# Patient Record
Sex: FEMALE | Race: OTHER | HISPANIC OR LATINO | URBAN - METROPOLITAN AREA
[De-identification: names, ages, dates, MRNs, and addresses within clinical notes are randomized per-mention and may not be internally consistent; named-entity substitution may affect disease eponyms.]

---

## 2024-08-07 ENCOUNTER — INPATIENT (INPATIENT)
Facility: HOSPITAL | Age: 54
LOS: 0 days | Discharge: HOME | DRG: 92 | End: 2024-08-08
Attending: INTERNAL MEDICINE | Admitting: INTERNAL MEDICINE
Payer: COMMERCIAL

## 2024-08-07 ENCOUNTER — RESULT REVIEW (OUTPATIENT)
Age: 54
End: 2024-08-07

## 2024-08-07 VITALS
RESPIRATION RATE: 18 BRPM | DIASTOLIC BLOOD PRESSURE: 80 MMHG | TEMPERATURE: 98 F | OXYGEN SATURATION: 97 % | HEIGHT: 62 IN | HEART RATE: 64 BPM | WEIGHT: 190.26 LBS | SYSTOLIC BLOOD PRESSURE: 163 MMHG

## 2024-08-07 DIAGNOSIS — E78.5 HYPERLIPIDEMIA, UNSPECIFIED: ICD-10-CM

## 2024-08-07 DIAGNOSIS — Z29.9 ENCOUNTER FOR PROPHYLACTIC MEASURES, UNSPECIFIED: ICD-10-CM

## 2024-08-07 DIAGNOSIS — E11.9 TYPE 2 DIABETES MELLITUS WITHOUT COMPLICATIONS: ICD-10-CM

## 2024-08-07 DIAGNOSIS — I63.9 CEREBRAL INFARCTION, UNSPECIFIED: ICD-10-CM

## 2024-08-07 DIAGNOSIS — R20.0 ANESTHESIA OF SKIN: ICD-10-CM

## 2024-08-07 LAB
ALBUMIN SERPL ELPH-MCNC: 4 G/DL — SIGNIFICANT CHANGE UP (ref 3.5–5)
ALBUMIN SERPL ELPH-MCNC: 4 G/DL — SIGNIFICANT CHANGE UP (ref 3.5–5)
ALP SERPL-CCNC: 84 U/L — SIGNIFICANT CHANGE UP (ref 40–120)
ALP SERPL-CCNC: 99 U/L — SIGNIFICANT CHANGE UP (ref 40–120)
ALT FLD-CCNC: 36 U/L DA — SIGNIFICANT CHANGE UP (ref 10–60)
ALT FLD-CCNC: 38 U/L DA — SIGNIFICANT CHANGE UP (ref 10–60)
ANION GAP SERPL CALC-SCNC: 4 MMOL/L — LOW (ref 5–17)
ANION GAP SERPL CALC-SCNC: 4 MMOL/L — LOW (ref 5–17)
APTT BLD: 34.5 SEC — SIGNIFICANT CHANGE UP (ref 24.5–35.6)
AST SERPL-CCNC: 28 U/L — SIGNIFICANT CHANGE UP (ref 10–40)
AST SERPL-CCNC: 35 U/L — SIGNIFICANT CHANGE UP (ref 10–40)
BASOPHILS # BLD AUTO: 0.07 K/UL — SIGNIFICANT CHANGE UP (ref 0–0.2)
BASOPHILS NFR BLD AUTO: 0.9 % — SIGNIFICANT CHANGE UP (ref 0–2)
BILIRUB SERPL-MCNC: 0.7 MG/DL — SIGNIFICANT CHANGE UP (ref 0.2–1.2)
BILIRUB SERPL-MCNC: 0.7 MG/DL — SIGNIFICANT CHANGE UP (ref 0.2–1.2)
BUN SERPL-MCNC: 12 MG/DL — SIGNIFICANT CHANGE UP (ref 7–18)
BUN SERPL-MCNC: 9 MG/DL — SIGNIFICANT CHANGE UP (ref 7–18)
CALCIUM SERPL-MCNC: 8.8 MG/DL — SIGNIFICANT CHANGE UP (ref 8.4–10.5)
CALCIUM SERPL-MCNC: 9.2 MG/DL — SIGNIFICANT CHANGE UP (ref 8.4–10.5)
CHLORIDE SERPL-SCNC: 107 MMOL/L — SIGNIFICANT CHANGE UP (ref 96–108)
CHLORIDE SERPL-SCNC: 108 MMOL/L — SIGNIFICANT CHANGE UP (ref 96–108)
CHOLEST SERPL-MCNC: 140 MG/DL — SIGNIFICANT CHANGE UP
CO2 SERPL-SCNC: 30 MMOL/L — SIGNIFICANT CHANGE UP (ref 22–31)
CO2 SERPL-SCNC: 31 MMOL/L — SIGNIFICANT CHANGE UP (ref 22–31)
CREAT SERPL-MCNC: 0.74 MG/DL — SIGNIFICANT CHANGE UP (ref 0.5–1.3)
CREAT SERPL-MCNC: 0.74 MG/DL — SIGNIFICANT CHANGE UP (ref 0.5–1.3)
EGFR: 95 ML/MIN/1.73M2 — SIGNIFICANT CHANGE UP
EGFR: 95 ML/MIN/1.73M2 — SIGNIFICANT CHANGE UP
EOSINOPHIL # BLD AUTO: 0.23 K/UL — SIGNIFICANT CHANGE UP (ref 0–0.5)
EOSINOPHIL NFR BLD AUTO: 2.8 % — SIGNIFICANT CHANGE UP (ref 0–6)
GLUCOSE BLDC GLUCOMTR-MCNC: 102 MG/DL — HIGH (ref 70–99)
GLUCOSE BLDC GLUCOMTR-MCNC: 117 MG/DL — HIGH (ref 70–99)
GLUCOSE BLDC GLUCOMTR-MCNC: 120 MG/DL — HIGH (ref 70–99)
GLUCOSE BLDC GLUCOMTR-MCNC: 151 MG/DL — HIGH (ref 70–99)
GLUCOSE SERPL-MCNC: 120 MG/DL — HIGH (ref 70–99)
GLUCOSE SERPL-MCNC: 190 MG/DL — HIGH (ref 70–99)
HCT VFR BLD CALC: 37 % — SIGNIFICANT CHANGE UP (ref 34.5–45)
HCT VFR BLD CALC: 38.7 % — SIGNIFICANT CHANGE UP (ref 34.5–45)
HDLC SERPL-MCNC: 42 MG/DL — LOW
HGB BLD-MCNC: 12.4 G/DL — SIGNIFICANT CHANGE UP (ref 11.5–15.5)
HGB BLD-MCNC: 13.3 G/DL — SIGNIFICANT CHANGE UP (ref 11.5–15.5)
IMM GRANULOCYTES NFR BLD AUTO: 0.1 % — SIGNIFICANT CHANGE UP (ref 0–0.9)
INR BLD: 1.05 RATIO — SIGNIFICANT CHANGE UP (ref 0.85–1.18)
LIPID PNL WITH DIRECT LDL SERPL: 73 MG/DL — SIGNIFICANT CHANGE UP
LYMPHOCYTES # BLD AUTO: 2.83 K/UL — SIGNIFICANT CHANGE UP (ref 1–3.3)
LYMPHOCYTES # BLD AUTO: 34.8 % — SIGNIFICANT CHANGE UP (ref 13–44)
MAGNESIUM SERPL-MCNC: 2.3 MG/DL — SIGNIFICANT CHANGE UP (ref 1.6–2.6)
MCHC RBC-ENTMCNC: 30 PG — SIGNIFICANT CHANGE UP (ref 27–34)
MCHC RBC-ENTMCNC: 30.6 PG — SIGNIFICANT CHANGE UP (ref 27–34)
MCHC RBC-ENTMCNC: 33.5 GM/DL — SIGNIFICANT CHANGE UP (ref 32–36)
MCHC RBC-ENTMCNC: 34.4 GM/DL — SIGNIFICANT CHANGE UP (ref 32–36)
MCV RBC AUTO: 89 FL — SIGNIFICANT CHANGE UP (ref 80–100)
MCV RBC AUTO: 89.6 FL — SIGNIFICANT CHANGE UP (ref 80–100)
MONOCYTES # BLD AUTO: 0.36 K/UL — SIGNIFICANT CHANGE UP (ref 0–0.9)
MONOCYTES NFR BLD AUTO: 4.4 % — SIGNIFICANT CHANGE UP (ref 2–14)
NEUTROPHILS # BLD AUTO: 4.64 K/UL — SIGNIFICANT CHANGE UP (ref 1.8–7.4)
NEUTROPHILS NFR BLD AUTO: 57 % — SIGNIFICANT CHANGE UP (ref 43–77)
NON HDL CHOLESTEROL: 98 MG/DL — SIGNIFICANT CHANGE UP
NRBC # BLD: 0 /100 WBCS — SIGNIFICANT CHANGE UP (ref 0–0)
NRBC # BLD: 0 /100 WBCS — SIGNIFICANT CHANGE UP (ref 0–0)
PHOSPHATE SERPL-MCNC: 3.4 MG/DL — SIGNIFICANT CHANGE UP (ref 2.5–4.5)
PLATELET # BLD AUTO: 199 K/UL — SIGNIFICANT CHANGE UP (ref 150–400)
PLATELET # BLD AUTO: 224 K/UL — SIGNIFICANT CHANGE UP (ref 150–400)
POTASSIUM SERPL-MCNC: 3.9 MMOL/L — SIGNIFICANT CHANGE UP (ref 3.5–5.3)
POTASSIUM SERPL-MCNC: 4.1 MMOL/L — SIGNIFICANT CHANGE UP (ref 3.5–5.3)
POTASSIUM SERPL-SCNC: 3.9 MMOL/L — SIGNIFICANT CHANGE UP (ref 3.5–5.3)
POTASSIUM SERPL-SCNC: 4.1 MMOL/L — SIGNIFICANT CHANGE UP (ref 3.5–5.3)
PROT SERPL-MCNC: 7.3 G/DL — SIGNIFICANT CHANGE UP (ref 6–8.3)
PROT SERPL-MCNC: 7.5 G/DL — SIGNIFICANT CHANGE UP (ref 6–8.3)
PROTHROM AB SERPL-ACNC: 11.9 SEC — SIGNIFICANT CHANGE UP (ref 9.5–13)
RBC # BLD: 4.13 M/UL — SIGNIFICANT CHANGE UP (ref 3.8–5.2)
RBC # BLD: 4.35 M/UL — SIGNIFICANT CHANGE UP (ref 3.8–5.2)
RBC # FLD: 12.7 % — SIGNIFICANT CHANGE UP (ref 10.3–14.5)
RBC # FLD: 13 % — SIGNIFICANT CHANGE UP (ref 10.3–14.5)
SODIUM SERPL-SCNC: 142 MMOL/L — SIGNIFICANT CHANGE UP (ref 135–145)
SODIUM SERPL-SCNC: 142 MMOL/L — SIGNIFICANT CHANGE UP (ref 135–145)
TRIGL SERPL-MCNC: 127 MG/DL — SIGNIFICANT CHANGE UP
TROPONIN I, HIGH SENSITIVITY RESULT: 3.4 NG/L — SIGNIFICANT CHANGE UP
WBC # BLD: 6.04 K/UL — SIGNIFICANT CHANGE UP (ref 3.8–10.5)
WBC # BLD: 8.14 K/UL — SIGNIFICANT CHANGE UP (ref 3.8–10.5)
WBC # FLD AUTO: 6.04 K/UL — SIGNIFICANT CHANGE UP (ref 3.8–10.5)
WBC # FLD AUTO: 8.14 K/UL — SIGNIFICANT CHANGE UP (ref 3.8–10.5)

## 2024-08-07 RX ORDER — ENOXAPARIN SODIUM 120 MG/.8ML
40 INJECTION SUBCUTANEOUS EVERY 24 HOURS
Refills: 0 | Status: DISCONTINUED | OUTPATIENT
Start: 2024-08-07 | End: 2024-08-08

## 2024-08-07 RX ORDER — ACETAMINOPHEN 500 MG
650 TABLET ORAL EVERY 6 HOURS
Refills: 0 | Status: DISCONTINUED | OUTPATIENT
Start: 2024-08-07 | End: 2024-08-08

## 2024-08-07 RX ORDER — ATORVASTATIN CALCIUM 40 MG/1
1 TABLET, FILM COATED ORAL
Refills: 0 | DISCHARGE

## 2024-08-07 RX ORDER — ASPIRIN 500 MG
81 TABLET ORAL DAILY
Refills: 0 | Status: DISCONTINUED | OUTPATIENT
Start: 2024-08-07 | End: 2024-08-08

## 2024-08-07 RX ORDER — MAGNESIUM, ALUMINUM HYDROXIDE 200-225/5
30 SUSPENSION, ORAL (FINAL DOSE FORM) ORAL EVERY 4 HOURS
Refills: 0 | Status: DISCONTINUED | OUTPATIENT
Start: 2024-08-07 | End: 2024-08-08

## 2024-08-07 RX ORDER — CLOPIDOGREL BISULFATE 75 MG/1
75 TABLET, FILM COATED ORAL DAILY
Refills: 0 | Status: DISCONTINUED | OUTPATIENT
Start: 2024-08-07 | End: 2024-08-08

## 2024-08-07 RX ORDER — DULAGLUTIDE 1.5 MG/.5ML
0.75 INJECTION, SOLUTION SUBCUTANEOUS
Refills: 0 | DISCHARGE

## 2024-08-07 RX ORDER — BUDESONIDE AND FORMOTEROL FUMARATE DIHYDRATE 80; 4.5 UG/1; UG/1
2 AEROSOL RESPIRATORY (INHALATION)
Refills: 0 | DISCHARGE

## 2024-08-07 RX ORDER — MELATONIN 3 MG
3 TABLET ORAL AT BEDTIME
Refills: 0 | Status: DISCONTINUED | OUTPATIENT
Start: 2024-08-07 | End: 2024-08-08

## 2024-08-07 RX ORDER — OXYBUTYNIN CHLORIDE 5 MG/1
1 TABLET, FILM COATED, EXTENDED RELEASE ORAL
Refills: 0 | DISCHARGE

## 2024-08-07 RX ORDER — ONDANSETRON HCL/PF 4 MG/2 ML
4 VIAL (ML) INJECTION EVERY 8 HOURS
Refills: 0 | Status: DISCONTINUED | OUTPATIENT
Start: 2024-08-07 | End: 2024-08-08

## 2024-08-07 RX ORDER — VIBEGRON 75 MG/1
1 TABLET, FILM COATED ORAL
Refills: 0 | DISCHARGE

## 2024-08-07 RX ORDER — INSULIN LISPRO 100/ML
VIAL (ML) SUBCUTANEOUS
Refills: 0 | Status: DISCONTINUED | OUTPATIENT
Start: 2024-08-07 | End: 2024-08-08

## 2024-08-07 RX ORDER — LABETALOL HCL 200 MG
10 TABLET ORAL ONCE
Refills: 0 | Status: COMPLETED | OUTPATIENT
Start: 2024-08-07 | End: 2024-08-07

## 2024-08-07 RX ORDER — INSULIN LISPRO 100/ML
VIAL (ML) SUBCUTANEOUS AT BEDTIME
Refills: 0 | Status: DISCONTINUED | OUTPATIENT
Start: 2024-08-07 | End: 2024-08-08

## 2024-08-07 RX ADMIN — ENOXAPARIN SODIUM 40 MILLIGRAM(S): 120 INJECTION SUBCUTANEOUS at 12:47

## 2024-08-07 RX ADMIN — CLOPIDOGREL BISULFATE 75 MILLIGRAM(S): 75 TABLET, FILM COATED ORAL at 12:54

## 2024-08-07 RX ADMIN — Medication 10 MILLIGRAM(S): at 03:21

## 2024-08-07 RX ADMIN — Medication 81 MILLIGRAM(S): at 12:47

## 2024-08-07 NOTE — ED ADULT NURSE NOTE - OBJECTIVE STATEMENT
the patient is  a 54 y Female c/o numbness in the left side of the body ,head ache left side of the head

## 2024-08-07 NOTE — H&P ADULT - PROBLEM SELECTOR PLAN 3
Patient on trulicity and metformin at home  c/w home medications Patient on trulicity and metformin at home  started on ELMO  F/u A1c in AM

## 2024-08-07 NOTE — CONSULT NOTE ADULT - ASSESSMENT
Impression:  Left sided weakness concerning for acute lacunar CVA     Recommendations:  1.             Admit to telemetry for 24hours to evaluate for arrythmias/ Afib.  2.             MRI brain  3.             TTE  4.             Please check HbA1C and fasting lipid profile  5.             Secondary stroke prevention: ASA 81mg (or ASA 325mg rectally if unable to take p) and Lipitor 40mg HS; Plavix x 3 weeks.  If the patient is on anticoagulation, there is no neurological need for ASA or Plavix.  6.             BP goal of normal  7.          Formal speech and swallow evaluation  8.          PT evaluation  9.          STAT CTH IF the patient has sudden change in mental status or neurological exam  10.          DVT PPx    Thank you for the courtesy of this consult.

## 2024-08-07 NOTE — ED ADULT NURSE NOTE - ED STAT RN HANDOFF DETAILS
Patient admitted to telmetry in no acute distress, assigned to 5N endorsed to floor RN,Patient to be transported with cardiac monitor transporter via stretcher safety maintained.

## 2024-08-07 NOTE — H&P ADULT - NSHPPHYSICALEXAM_GEN_ALL_CORE
PHYSICAL EXAMINATION:  GENERAL: NAD, well built  HEAD:  Atraumatic, Normocephalic  EYES:  conjunctiva and sclera clear  NECK: Supple, No JVD, Normal thyroid  CHEST/LUNG: Clear to auscultation. No rales, rhonchi, wheezing, or rubs  HEART: Regular rate and rhythm; No murmurs, rubs, or gallops  ABDOMEN: Soft, Nontender, Nondistended; Bowel sounds present  NERVOUS SYSTEM:  Alert & Oriented X3,    EXTREMITIES:  2+ Peripheral Pulses, No clubbing, cyanosis, or edema  SKIN: warm dry    T(C): 36.8 (08-07-24 @ 07:37), Max: 36.8 (08-07-24 @ 07:37)  HR: 60 (08-07-24 @ 07:37) (60 - 78)  BP: 131/69 (08-07-24 @ 07:37) (112/61 - 163/80)  RR: 18 (08-07-24 @ 07:37) (18 - 18)  SpO2: 97% (08-07-24 @ 07:37) (97% - 100%)

## 2024-08-07 NOTE — H&P ADULT - REASON FOR ADMISSION
VOICEMAIL  1. Caller Name: ***                      Call Back Number: ***    2. Message: ***    3. Patient approves office to leave a detailed voicemail/MyChart message: {YES/NO:63}     CVA R/o

## 2024-08-07 NOTE — H&P ADULT - PROBLEM SELECTOR PLAN 1
CVA vs Migraine   patient presenting with no deficits  CTH and CTA negative for ICH or vasoocclusive disease  EKG showed NSR  ABCD2 score 2  f/u TTE  started ASA  f/u lipid panel, A1c  q4 neuro checks  aspiration precautions  dysphagia screen  f/u speech and swallow  Neuro  CVA vs Migraine   patient presenting with no deficits  CTH and CTA negative for ICH or vasoocclusive disease  EKG showed NSR  ABCD2 score 2  f/u TTE  started ASA  f/u lipid panel, A1c  q4 neuro checks  Neuro Dr. wilder  Cardio Dr. Gutierrez

## 2024-08-07 NOTE — ED PROVIDER NOTE - PHYSICAL EXAMINATION
General: Well appearing, no acute distress, appears stated age  HEENT: normocephalic, atraumatic   Respiratory: normal work of breathing  MSK: no swelling or tenderness of lower extremities, moving all extremities spontaneously   Skin: warm, dry  Neuro: A&Ox3, cranial nerves II-XII intact, 5/5 strength in all extremities, Decreased sensation in face arms legs, no facial deficit, no ataxia, no pronator drift.  normal gait   Psych: appropriate affect

## 2024-08-07 NOTE — ED ADULT NURSE NOTE - CHPI ED NUR SYMPTOMS NEG
no blurred vision/no change in level of consciousness/no dizziness/no fever/no loss of consciousness/no nausea

## 2024-08-07 NOTE — CONSULT NOTE ADULT - SUBJECTIVE AND OBJECTIVE BOX
C A R D I O L O G Y  *********************    DATE OF SERVICE: 08-07-24    HISTORY OF PRESENT ILLNESS:   54F PMH DM and HLD well known to our office, normal stress test and echo earlier this year presented to the ED for left sided facial and arm numbness. Patient stated she had a an argument which was very stressful at 9pm yesterday after which she took a nap. After the nap the patient woke up and felt her left side of face and left arm felt numb, Patient also complaining of severe left sided headache. Patient stated this had happened once 20 years ago under the same circumstances and she was told she had a stroke but was not started on any medications at that time. Patient denies any fever, chills, dizziness , SOB, cough, chest pain, palpitations, nausea, vomiting, diarrhea, abdominal pain, urinary symptoms, lower extremity pain, or edema. Patient denies recent travel or sick contacts. Patient denies prodrome, LOC, dizziness, jerky/unintended movements, urinary or bowel incontinence, vision disturbances, fall, head trauma or confusion.     PAST MEDICAL & SURGICAL HISTORY:    Diabetes  HLD (hyperlipidemia)        MEDICATIONS:  MEDICATIONS  (STANDING):  aspirin  chewable 81 milliGRAM(s) Oral daily  clopidogrel Tablet 75 milliGRAM(s) Oral daily  enoxaparin Injectable 40 milliGRAM(s) SubCutaneous every 24 hours  insulin lispro (ADMELOG) corrective regimen sliding scale   SubCutaneous three times a day before meals  insulin lispro (ADMELOG) corrective regimen sliding scale   SubCutaneous at bedtime      Allergies    No Known Allergies    Intolerances        FAMILY HISTORY:    Non-contributary for premature coronary disease or sudden cardiac death    SOCIAL HISTORY:    [X ] Non-smoker  [ ] Smoker  [ ] Alcohol        REVIEW OF SYSTEMS:  [ ]chest pain  [  ]shortness of breath  [  ]palpitations  [  ]syncope  [ ]near syncope [ ]upper extremity weakness   [ ] lower extremity weakness  [  ]diplopia  [  ]altered mental status   [  ]fevers  [ ]chills [ ]nausea  [ ]vomitting  [  ]dysphagia    [ ]abdominal pain  [ ]melena  [ ]BRBPR    [  ]epistaxis  [  ]rash    [ ]lower extremity edema        [X] All others negative	  [ ] Unable to obtain      LABS:	 	    CARDIAC MARKERS:        Troponin I, High Sensitivity Result: 3.4 ng/L (08-07-24 @ 02:51)                            12.4   6.04  )-----------( 199      ( 07 Aug 2024 10:15 )             37.0     Hb Trend: 12.4<--, 13.3<--    08-07    142  |  108  |  9   ----------------------------<  190<H>  4.1   |  30  |  0.74    Ca    9.2      07 Aug 2024 10:15  Phos  3.4     08-07  Mg     2.3     08-07    TPro  7.3  /  Alb  4.0  /  TBili  0.7  /  DBili  x   /  AST  28  /  ALT  36  /  AlkPhos  84  08-07    Creatinine Trend: 0.74<--, 0.74<--    Coags:  PT/INR - ( 07 Aug 2024 02:51 )   PT: 11.9 sec;   INR: 1.05 ratio         PTT - ( 07 Aug 2024 02:51 )  PTT:34.5 sec          PHYSICAL EXAM:  T(C): 36.7 (08-07-24 @ 11:12), Max: 36.8 (08-07-24 @ 07:37)  HR: 62 (08-07-24 @ 11:12) (60 - 78)  BP: 139/66 (08-07-24 @ 11:12) (112/61 - 163/80)  RR: 18 (08-07-24 @ 11:12) (18 - 18)  SpO2: 95% (08-07-24 @ 11:12) (95% - 100%)  Wt(kg): --   BMI (kg/m2): 34.8 (08-07-24 @ 02:23)  I&O's Summary      HEENT:  (-)icterus (-)pallor  CV: N S1 S2 1/6 MOMO (+)2 Pulses B/l  Resp:  Clear to ausculatation B/L, normal effort  GI: (+) BS Soft, NT, ND  Lymph:  (-)Edema, (-)obvious lymphadenopathy  Skin: Warm to touch, Normal turgor  Psych: Appropriate mood and affect        TELEMETRY: 	  sinus    ECG:  	Sinus 62 BPM,     RADIOLOGY:         CXR:   NONE    ASSESSMENT/PLAN: 	54y Female PMH DM and HLD well known to our office, normal stress test and echo earlier this year presented to the ED for left sided facial and arm numbness concern for CVA    # CVA  - Neuro eval noted  - f/u MRI and MRA  - Ech [facility] [place] bubble study  - No pertinetn events not yudelka thus far  - asa and plavix per neuro  - consider statin    I once again thank you for allowing me to participate in the care of your patient.  If you have any questions or concerns please do not hesitate to contact me.    Sergio Gutierrez MD, [facility]  BEEPER [phone]

## 2024-08-07 NOTE — ED ADULT TRIAGE NOTE - CHIEF COMPLAINT QUOTE
Pt stated she feels numbness to the left side of her body and pressure on the left side of her head. Pt stated at 12pm last night before she went to bed she felt normal. Denies feeling weakness to left side of body

## 2024-08-07 NOTE — H&P ADULT - ASSESSMENT
54F PMHx of diabetes and HLD presenting to the ED w/ complaints of sudden onset numbness. Patient admitted for CVA r/o.

## 2024-08-07 NOTE — CONSULT NOTE ADULT - SUBJECTIVE AND OBJECTIVE BOX
***TEMPLATE ONLY***      Patient is a 54y old  Female who presents with a chief complaint of CVA R/o (07 Aug 2024 07:04)      HPI:  54F PMH DM and HLD presented to the ED for left sided facial and arm numbness. Patient stated she had a an argument which was very stressful at 9pm yesterday after which she took a nap. After the nap the patient woke up and felt her left side of face and left arm felt numb, Patient also complaining of severe left sided headache. Patient stated this had happened once 20 years ago under the same circumstances and she was told she had a stroke but was not started on any medications at that time. Patient denies any fever, chills, dizziness , SOB, cough, chest pain, palpitations, nausea, vomiting, diarrhea, abdominal pain, urinary symptoms, lower extremity pain, or edema. Patient denies recent travel or sick contacts. Patient denies prodrome, LOC, dizziness, jerky/unintended movements,urinary or bowel incontinence, vision disturbances, fall, head trauma or confusion.  (07 Aug 2024 07:04)           The patient was last know well at  The patient lives at home/ NH.  The patient walks without assistance/ with a cane or walker    Neurological Review of Systems:  No difficulty with language.  No vision loss or double vision.  No dizziness, vertigo or new hearing loss.  No difficulty with speech or swallowing.  No focal weakness.  No focal sensory changes.  No numbness or tingling in the bilateral lower extremities.  No difficulty with balance.  No difficulty with ambulation.      MEDICATIONS  (STANDING):  aspirin  chewable 81 milliGRAM(s) Oral daily  enoxaparin Injectable 40 milliGRAM(s) SubCutaneous every 24 hours  insulin lispro (ADMELOG) corrective regimen sliding scale   SubCutaneous three times a day before meals  insulin lispro (ADMELOG) corrective regimen sliding scale   SubCutaneous at bedtime    MEDICATIONS  (PRN):  acetaminophen     Tablet .. 650 milliGRAM(s) Oral every 6 hours PRN Temp greater or equal to 38C (100.4F), Mild Pain (1 - 3)  aluminum hydroxide/magnesium hydroxide/simethicone Suspension 30 milliLiter(s) Oral every 4 hours PRN Dyspepsia  melatonin 3 milliGRAM(s) Oral at bedtime PRN Insomnia  ondansetron Injectable 4 milliGRAM(s) IV Push every 8 hours PRN Nausea and/or Vomiting    Allergies    No Known Allergies    Intolerances      PAST MEDICAL & SURGICAL HISTORY:  Diabetes      HLD (hyperlipidemia)        FAMILY HISTORY:    SOCIAL HISTORY: non smoker/ former smoker/ active smoker    Review of Systems:  Constitutional: No generalized weakness. No fevers or chills.                    Eyes, Ears, Mouth, Throat: No vision loss   Respiratory: No shortness of breath or cough.                                Cardiovascular: No chest pain or palpitations  Gastrointestinal: No nausea or vomiting.                                         Genitourinary: No urinary incontinence or burning on urination.  Musculoskeletal: No joint pain.                                                           Dermatologic: No rash.  Neurological: as per HPI                                                                      Psychiatric: No behavioral problems.  Endocrine: No known hypoglycemia.               Hematologic/Lymphatic: No easy bleeding.    O:  Vital Signs Last 24 Hrs  T(C): 36.8 (07 Aug 2024 07:37), Max: 36.8 (07 Aug 2024 07:37)  T(F): 98.2 (07 Aug 2024 07:37), Max: 98.2 (07 Aug 2024 07:37)  HR: 60 (07 Aug 2024 07:37) (60 - 78)  BP: 131/69 (07 Aug 2024 07:37) (112/61 - 163/80)  BP(mean): --  RR: 18 (07 Aug 2024 07:37) (18 - 18)  SpO2: 97% (07 Aug 2024 07:37) (97% - 100%)    Parameters below as of 07 Aug 2024 07:37  Patient On (Oxygen Delivery Method): room air        General Exam:   General appearance: No acute distress                 Cardiovascular: Pedal dorsalis pulses intact bilaterally    Neurological Exam:  NIH Stroke Scale (NIHSS):   1a. LOConscious:  0-alert 1-lethargy 2-obtund 3-coma:    _____  1b. LOC Questions:  0-both 1-one 2-none                       _____  1c. LOC Commands:  0-both 1-one 2-none                     _____  2.   Gaze:  0-nl 1-partial 2-conjugate                                _____  3.   Visual:  0-nl 1-part rosaline 2-full rosaline 3-bilat rosaline         _____  4.   Facial Palsy:  0-nl 1-minor 2-part 3-complete             _____  5.   Motor Arm:  0-nl 1-drift 2-effort 3-no effort         Left             _____                              4-no move UN-amputated                     Right  _____  6.   Motor Leg:                                                                 Left   _____                                                                                   Right _____  7.   Ataxia:  0-nl 1-one limb 2-two UN-amp                      _____  8.   Sensory:  0-nl 1-mild 2-severe                                  _____  9.   Language:  0-nl 1-mild 2-severe 3-mute                     _____  10.  Dysarthria:  0-nl 1-mild 2-severe 3-barrier                  _____  11.  Extinction/Inattention:  0-nl 1-mild 2-deep                 _____         TOTAL NIHSS       ________    MRS Score:  _____  (MRS Scoring.  No symptoms at all: 0;   No significant disability despite symptoms; able to carry out all usual duties and activities: +1;  Slight disability; unable to carry out all previous activities, but able to look after own affairs without assistance: +2;  Moderate disability; requiring some help, but able to walk without assistance: +3;  Moderately severe disability; unable to walk and attend to bodily needs without assistance: +4;   Severe disability; bedridden, incontinent and requiring constant nursing care and attention:  +5;  Dead: +6)    Mental Status: Orientated to self, date and place.  Attention intact.  No dysarthria, aphasia or neglect.  Knowledge intact.  Registration intact.  Short and long term memory grossly intact.      Cranial Nerves: CN I - not tested.  PERRL, EOMI, VFF, no nystagmus or diplopia.  No APD.  Fundi not visualized bilaterally.  CN V1-3 intact to light touch.  No facial asymmetry.  Hearing intact to finger rub bilaterally.  Tongue, uvula and palate midline.  Sternocleidomastoid and Trapezius intact bilaterally.    Motor:   Tone: normal.                  Strength intact throughout  No pronator drift bilaterally                      No dysmetria on finger-nose-finger or heel-shin-heel  No truncal ataxia.  No resting, postural or action tremor.  No myoclonus.    Sensation: intact to light touch    Deep Tendon Reflexes: 1+ bilateral biceps, triceps, brachioradialis, knee and ankle  Toes flexor bilaterally    Gait: normal and stable.  Rhomberg -braxton.    Other:      LABS:                        12.4   6.04  )-----------( 199      ( 07 Aug 2024 10:15 )             37.0     08-07    142  |  107  |  12  ----------------------------<  120<H>  3.9   |  31  |  0.74    Ca    8.8      07 Aug 2024 02:51    TPro  7.5  /  Alb  4.0  /  TBili  0.7  /  DBili  x   /  AST  35  /  ALT  38  /  AlkPhos  99  08-07    PT/INR - ( 07 Aug 2024 02:51 )   PT: 11.9 sec;   INR: 1.05 ratio         PTT - ( 07 Aug 2024 02:51 )  PTT:34.5 sec  Urinalysis Basic - ( 07 Aug 2024 02:51 )    Color: x / Appearance: x / SG: x / pH: x  Gluc: 120 mg/dL / Ketone: x  / Bili: x / Urobili: x   Blood: x / Protein: x / Nitrite: x   Leuk Esterase: x / RBC: x / WBC x   Sq Epi: x / Non Sq Epi: x / Bacteria: x      LDL  HbA1C  < from: CT Brain Perfusion Maps Stroke (08.07.24 @ 03:14) >    IMPRESSION:    CT PERFUSION:  Technical limitations: None.    Core infarction: 0 ml  Penumbra / tissue atrisk for active ischemia: 0 ml    CTA NECK:  No evidence of significant stenosis or occlusion.  Irregular beaded appearance of the bilateral distal cervical internal   carotid arteries, which is suggestive of fibromuscular dysplasia or other   arteriopathy.    CTA HEAD:  No large vessel occlusion, significant stenosis or vascular abnormality   identified.    < end of copied text >    RADIOLOGY & ADDITIONAL STUDIES:    EKG: agopi    Impression:       Recommendations:  1.             Admit to telemetry for 24hours to evaluate for arrythmias/ Afib.  2.             MRI brain, MRA head without contrast, Carotid duplex (CD).  If unable to get MR imaging, please consider CTA head and neck in 24hours (no need for CD in this case).  If the patient is unable to get MR and unable to get IV contrast please repeat the CTH in 24hours and get a CD.  Role of the imaging is to evaluate for stroke and evaluate vasculature for artherosclerosis/ thrombi which may have lead to a stroke.  3.             TTE  4.             Please check HbA1C and fasting lipid profile  5.             Secondary stroke prevention: ASA 81mg (or ASA 325mg rectally if unable to take p) and Lipitor 40mg HS; Plavix x 3 weeks.  If the patient is on anticoagulation, there is no neurological need for ASA or Plavix.  6.             BP goal of normal/ permissive HTN of SBP <200/<180<160  7.             NS at 125 cc/h/ D5 NS at 125 cc/hour, if NPO, if cardiac function allows, to ensure good perfusion  8.             Frequent neurochecks  9.             Urine Tox  10.          Able to resume normal diet as passed bedside swallowing test/ NPO for now  11.          Formal speech and swallow evaluation  12.          PT evaluation  13.          STAT CTH IF the patient has sudden change in mental status or neurological exam  14.          DVT PPx    Thank you for the courtesy of this consult.         Patient is a 54y old  Female who presents with a chief complaint of CVA R/o (07 Aug 2024 07:04)      HPI:  54F PMH DM and HLD presented to the ED for left sided facial and arm numbness. Patient stated she had a an argument which was very stressful at 9pm yesterday after which she took a nap. After the nap the patient woke up and felt her left side of face and left arm felt numb, Patient also complaining of severe left sided headache. Patient stated this had happened once 20 years ago under the same circumstances and she was told she had a stroke but was not started on any medications at that time. Patient denies any fever, chills, dizziness , SOB, cough, chest pain, palpitations, nausea, vomiting, diarrhea, abdominal pain, urinary symptoms, lower extremity pain, or edema. Patient denies recent travel or sick contacts. Patient denies prodrome, LOC, dizziness, jerky/unintended movements,urinary or bowel incontinence, vision disturbances, fall, head trauma or confusion.  (07 Aug 2024 07:04)      The patient lives at home.  The patient walks without assistance    Neurological Review of Systems:  No difficulty with language.  No vision loss or double vision.  No dizziness, vertigo or new hearing loss.  No difficulty with speech or swallowing.  No focal weakness.    No numbness or tingling in the bilateral lower extremities.  No difficulty with balance.  No difficulty with ambulation.      MEDICATIONS  (STANDING):  aspirin  chewable 81 milliGRAM(s) Oral daily  enoxaparin Injectable 40 milliGRAM(s) SubCutaneous every 24 hours  insulin lispro (ADMELOG) corrective regimen sliding scale   SubCutaneous three times a day before meals  insulin lispro (ADMELOG) corrective regimen sliding scale   SubCutaneous at bedtime    MEDICATIONS  (PRN):  acetaminophen     Tablet .. 650 milliGRAM(s) Oral every 6 hours PRN Temp greater or equal to 38C (100.4F), Mild Pain (1 - 3)  aluminum hydroxide/magnesium hydroxide/simethicone Suspension 30 milliLiter(s) Oral every 4 hours PRN Dyspepsia  melatonin 3 milliGRAM(s) Oral at bedtime PRN Insomnia  ondansetron Injectable 4 milliGRAM(s) IV Push every 8 hours PRN Nausea and/or Vomiting    Allergies    No Known Allergies    Intolerances      PAST MEDICAL & SURGICAL HISTORY:  Diabetes      HLD (hyperlipidemia)        FAMILY HISTORY: nv daughter    SOCIAL HISTORY: non smoker    Review of Systems:  Constitutional: No fevers or chills.                    Eyes, Ears, Mouth, Throat: No vision loss   Respiratory: No cough.                                Cardiovascular: No chest pain   Gastrointestinal: No vomiting.                                         Genitourinary: No urinary incontinence   Musculoskeletal: No joint pain.                                                           Dermatologic: No rash.  Neurological: as per HPI                                                                      Psychiatric: No behavioral problems.  Endocrine: No known hypoglycemia.               Hematologic/Lymphatic: No easy bleeding.    O:  Vital Signs Last 24 Hrs  T(C): 36.8 (07 Aug 2024 07:37), Max: 36.8 (07 Aug 2024 07:37)  T(F): 98.2 (07 Aug 2024 07:37), Max: 98.2 (07 Aug 2024 07:37)  HR: 60 (07 Aug 2024 07:37) (60 - 78)  BP: 131/69 (07 Aug 2024 07:37) (112/61 - 163/80)  BP(mean): --  RR: 18 (07 Aug 2024 07:37) (18 - 18)  SpO2: 97% (07 Aug 2024 07:37) (97% - 100%)    Parameters below as of 07 Aug 2024 07:37  Patient On (Oxygen Delivery Method): room air        General Exam:   General appearance: No acute distress                 Cardiovascular: Pedal dorsalis pulses intact bilaterally    Neurological Exam:  NIH Stroke Scale (NIHSS):   1a. LOConscious:  0-alert 1-lethargy 2-obtund 3-coma:    __0___  1b. LOC Questions:  0-both 1-one 2-none                       ___0__  1c. LOC Commands:  0-both 1-one 2-none                     ____0_  2.   Gaze:  0-nl 1-partial 2-conjugate                                ____0_  3.   Visual:  0-nl 1-part rosaline 2-full rosaline 3-bilat rosaline         _____0  4.   Facial Palsy:  0-nl 1-minor 2-part 3-complete             _____0  5.   Motor Arm:  0-nl 1-drift 2-effort 3-no effort         Left             __0___                              4-no move UN-amputated                     Right  __0___  6.   Motor Leg:                                                                 Left   _____0                                                                                   Right _____0  7.   Ataxia:  0-nl 1-one limb 2-two UN-amp                      _____0  8.   Sensory:  0-nl 1-mild 2-severe                                  _1____  9.   Language:  0-nl 1-mild 2-severe 3-mute                     _____0  10.  Dysarthria:  0-nl 1-mild 2-severe 3-barrier                  _____0  11.  Extinction/Inattention:  0-nl 1-mild 2-deep                 _____   0      TOTAL NIHSS       ___1_____    MRS Score:  _0___  (MRS Scoring.  No symptoms at all: 0;   No significant disability despite symptoms; able to carry out all usual duties and activities: +1;  Slight disability; unable to carry out all previous activities, but able to look after own affairs without assistance: +2;  Moderate disability; requiring some help, but able to walk without assistance: +3;  Moderately severe disability; unable to walk and attend to bodily needs without assistance: +4;   Severe disability; bedridden, incontinent and requiring constant nursing care and attention:  +5;  Dead: +6)    Mental Status: Orientated to self, date and place.  Attention intact.  No dysarthria, aphasia or neglect.  Knowledge intact.  Registration intact.  Short and long term memory grossly intact.      Cranial Nerves: CN I - not tested.  PERRL, EOMI, VFF, no nystagmus or diplopia.  No APD.  Fundi not visualized bilaterally.  CN V1-3 sensory loss to the left side.  No facial asymmetry.  Hearing intact to finger rub bilaterally.  Tongue, uvula and palate midline.  Sternocleidomastoid and Trapezius intact bilaterally.    Motor:   Tone: normal.                  Strength intact throughout  No pronator drift bilaterally                      No dysmetria on finger-nose-finger or heel-shin-heel  No truncal ataxia.  No resting, postural or action tremor.  No myoclonus.    Sensation: sensory loss to the left arm and leg    Deep Tendon Reflexes: 1+ bilateral biceps, triceps, brachioradialis, knee and ankle  Toes flexor bilaterally    Gait: normal and stable.      Other:      LABS:                        12.4   6.04  )-----------( 199      ( 07 Aug 2024 10:15 )             37.0     08-07    142  |  107  |  12  ----------------------------<  120<H>  3.9   |  31  |  0.74    Ca    8.8      07 Aug 2024 02:51    TPro  7.5  /  Alb  4.0  /  TBili  0.7  /  DBili  x   /  AST  35  /  ALT  38  /  AlkPhos  99  08-07    PT/INR - ( 07 Aug 2024 02:51 )   PT: 11.9 sec;   INR: 1.05 ratio         PTT - ( 07 Aug 2024 02:51 )  PTT:34.5 sec  Urinalysis Basic - ( 07 Aug 2024 02:51 )    Color: x / Appearance: x / SG: x / pH: x  Gluc: 120 mg/dL / Ketone: x  / Bili: x / Urobili: x   Blood: x / Protein: x / Nitrite: x   Leuk Esterase: x / RBC: x / WBC x   Sq Epi: x / Non Sq Epi: x / Bacteria: x      LDL  HbA1C    < from: CT Brain Perfusion Maps Stroke (08.07.24 @ 03:14) > (rmages eviewed)    IMPRESSION:    CT PERFUSION:  Technical limitations: None.    Core infarction: 0 ml  Penumbra / tissue atrisk for active ischemia: 0 ml    CTA NECK:  No evidence of significant stenosis or occlusion.  Irregular beaded appearance of the bilateral distal cervical internal   carotid arteries, which is suggestive of fibromuscular dysplasia or other   arteriopathy.    CTA HEAD:  No large vessel occlusion, significant stenosis or vascular abnormality   identified.    < end of copied text >    RADIOLOGY & ADDITIONAL STUDIES:

## 2024-08-07 NOTE — ED PROVIDER NOTE - OBJECTIVE STATEMENT
54-year-old female presents with numbness.  Patient states she woke up at 1:30 in the morning and her left arm leg and face were numb.  She got into an argument at 9:00 going to bed at midnight.  She states the same thing happened over 20 years ago after an argument her both arms were clogged she went to the hospital by ambulance and told her she had a stroke.  She denies weakness trouble speech swallowing understanding

## 2024-08-07 NOTE — PATIENT PROFILE ADULT - FALL HARM RISK - UNIVERSAL INTERVENTIONS
Bed in lowest position, wheels locked, appropriate side rails in place/Call bell, personal items and telephone in reach/Instruct patient to call for assistance before getting out of bed or chair/Non-slip footwear when patient is out of bed/[place]azel to call system/Physically safe environment - no spills, clutter or unnecessary equipment/Purposeful Proactive Rounding/Room/bathroom lighting operational, light cord in reach

## 2024-08-07 NOTE — ED PROVIDER NOTE - CLINICAL SUMMARY MEDICAL DECISION MAKING FREE TEXT BOX
Patient with arm leg and face numbness, NIH score 3, not tPA candidate because of low score.  CT with fibromyalgia dysplasia will admit for further neurologic examination.

## 2024-08-07 NOTE — ED ADULT NURSE REASSESSMENT NOTE - NS ED NURSE REASSESS COMMENT FT1
Received patient admitted to telemetry with SB in no acute distress denies any pain or discomfort ,awaiting bed.

## 2024-08-07 NOTE — H&P ADULT - HISTORY OF PRESENT ILLNESS
[age] PMH DM and HLD presented to the ED for left sided facial and arm numbness. Patient stated she had a an argument which was very stressful at 9pm yesterday after which she took a nap. After the nap the patient woke up and felt her left side of face and left arm felt numb, Patient also complaining of severe left sided headache. Patient stated this had happened once 20 years ago under the same circumstances and she was told she had a stroke but was not started on any medications at that time. Patient denies any fever, chills, dizziness , SOB, cough, chest pain, palpitations, nausea, vomiting, diarrhea, abdominal pain, urinary symptoms, lower extremity pain, or edema. Patient denies recent travel or sick contacts. Patient denies prodrome, LOC, dizziness, jerky/unintended movements,urinary or bowel incontinence, vision disturbances, fall, head trauma or confusion.

## 2024-08-07 NOTE — H&P ADULT - ATTENDING COMMENTS
54-year-old female presents with numbness.  Patient states she woke up at 1:30 in the morning and her left arm leg and face were numb.  She got into an argument at 9:00 going to bed at midnight.  She states the same thing happened over 20 years ago after an argument her both arms were clogged she went to the hospital by ambulance and told her she had a stroke.  She denies weakness trouble speech swallowing understanding      < from: CT Brain Perfusion Maps Stroke (08.07.24 @ 03:14) >    IMPRESSION:    CT PERFUSION:  Technical limitations: None.    Core infarction: 0 ml  Penumbra / tissue atrisk for active ischemia: 0 ml    CTA NECK:  No evidence of significant stenosis or occlusion.  Irregular beaded appearance of the bilateral distal cervical internal   carotid arteries, which is suggestive of fibromuscular dysplasia or other   arteriopathy.    CTA HEAD:  No large vessel occlusion, significant stenosis or vascular abnormality   identified.    < end of copied text >      assessment   --- r/o acute cva, r/o arythmia, h/o htn, dm, hld    plan  --  adm to tele, aspirin, statin, hold outpt dm meds, lispro ss, cont preadmit home meds, gi and dvt prophylaxis  cbc, bmp, mg, phos, lipid, tsh, hgba1c, ce q8 x3    echo      cardio cons  neuro cons.

## 2024-08-08 ENCOUNTER — TRANSCRIPTION ENCOUNTER (OUTPATIENT)
Age: 54
End: 2024-08-08

## 2024-08-08 VITALS
RESPIRATION RATE: 18 BRPM | DIASTOLIC BLOOD PRESSURE: 63 MMHG | OXYGEN SATURATION: 98 % | HEART RATE: 63 BPM | TEMPERATURE: 99 F | SYSTOLIC BLOOD PRESSURE: 108 MMHG

## 2024-08-08 LAB
A1C WITH ESTIMATED AVERAGE GLUCOSE RESULT: 5.8 % — HIGH (ref 4–5.6)
ALBUMIN SERPL ELPH-MCNC: 4 G/DL — SIGNIFICANT CHANGE UP (ref 3.5–5)
ALP SERPL-CCNC: 67 U/L — SIGNIFICANT CHANGE UP (ref 40–120)
ALT FLD-CCNC: 35 U/L DA — SIGNIFICANT CHANGE UP (ref 10–60)
ANION GAP SERPL CALC-SCNC: 3 MMOL/L — LOW (ref 5–17)
AST SERPL-CCNC: 25 U/L — SIGNIFICANT CHANGE UP (ref 10–40)
BILIRUB SERPL-MCNC: 0.9 MG/DL — SIGNIFICANT CHANGE UP (ref 0.2–1.2)
BUN SERPL-MCNC: 12 MG/DL — SIGNIFICANT CHANGE UP (ref 7–18)
CALCIUM SERPL-MCNC: 9.2 MG/DL — SIGNIFICANT CHANGE UP (ref 8.4–10.5)
CHLORIDE SERPL-SCNC: 108 MMOL/L — SIGNIFICANT CHANGE UP (ref 96–108)
CO2 SERPL-SCNC: 29 MMOL/L — SIGNIFICANT CHANGE UP (ref 22–31)
CREAT SERPL-MCNC: 0.63 MG/DL — SIGNIFICANT CHANGE UP (ref 0.5–1.3)
EGFR: 104 ML/MIN/1.73M2 — SIGNIFICANT CHANGE UP
ESTIMATED AVERAGE GLUCOSE: 120 MG/DL — HIGH (ref 68–114)
GLUCOSE BLDC GLUCOMTR-MCNC: 105 MG/DL — HIGH (ref 70–99)
GLUCOSE BLDC GLUCOMTR-MCNC: 157 MG/DL — HIGH (ref 70–99)
GLUCOSE SERPL-MCNC: 119 MG/DL — HIGH (ref 70–99)
HCT VFR BLD CALC: 40.9 % — SIGNIFICANT CHANGE UP (ref 34.5–45)
HGB BLD-MCNC: 13.4 G/DL — SIGNIFICANT CHANGE UP (ref 11.5–15.5)
MCHC RBC-ENTMCNC: 29.8 PG — SIGNIFICANT CHANGE UP (ref 27–34)
MCHC RBC-ENTMCNC: 32.8 GM/DL — SIGNIFICANT CHANGE UP (ref 32–36)
MCV RBC AUTO: 91.1 FL — SIGNIFICANT CHANGE UP (ref 80–100)
NRBC # BLD: 0 /100 WBCS — SIGNIFICANT CHANGE UP (ref 0–0)
PLATELET # BLD AUTO: 224 K/UL — SIGNIFICANT CHANGE UP (ref 150–400)
POTASSIUM SERPL-MCNC: 4.8 MMOL/L — SIGNIFICANT CHANGE UP (ref 3.5–5.3)
POTASSIUM SERPL-SCNC: 4.8 MMOL/L — SIGNIFICANT CHANGE UP (ref 3.5–5.3)
PROT SERPL-MCNC: 7.6 G/DL — SIGNIFICANT CHANGE UP (ref 6–8.3)
RBC # BLD: 4.49 M/UL — SIGNIFICANT CHANGE UP (ref 3.8–5.2)
RBC # FLD: 12.8 % — SIGNIFICANT CHANGE UP (ref 10.3–14.5)
SODIUM SERPL-SCNC: 140 MMOL/L — SIGNIFICANT CHANGE UP (ref 135–145)
WBC # BLD: 6.74 K/UL — SIGNIFICANT CHANGE UP (ref 3.8–10.5)
WBC # FLD AUTO: 6.74 K/UL — SIGNIFICANT CHANGE UP (ref 3.8–10.5)

## 2024-08-08 RX ORDER — ASPIRIN 500 MG
1 TABLET ORAL
Qty: 30 | Refills: 0
Start: 2024-08-08 | End: 2024-09-06

## 2024-08-08 RX ORDER — ATORVASTATIN CALCIUM 40 MG/1
20 TABLET, FILM COATED ORAL AT BEDTIME
Refills: 0 | Status: DISCONTINUED | OUTPATIENT
Start: 2024-08-08 | End: 2024-08-08

## 2024-08-08 RX ORDER — CLOPIDOGREL BISULFATE 75 MG/1
1 TABLET, FILM COATED ORAL
Qty: 21 | Refills: 0
Start: 2024-08-08 | End: 2024-08-28

## 2024-08-08 NOTE — PROGRESS NOTE ADULT - SUBJECTIVE AND OBJECTIVE BOX
Patient is a 54y old  Female who presents with a chief complaint of CVA R/o (07 Aug 2024 12:31)    pt seen in icu [  ], reg med floor [   ], bed [  ], chair at bedside [   ], a+o x3 [  ], lethargic [  ],  nad [  ]    martines [  ], ngt [  ], peg [  ], et tube [  ], cent line [  ], picc line [  ]        Allergies    No Known Allergies        Vitals    T(F): 97.7 (08-08-24 @ 04:40), Max: 98.4 (08-07-24 @ 19:17)  HR: 54 (08-08-24 @ 04:40) (48 - 71)  BP: 118/65 (08-08-24 @ 04:40) (101/49 - 139/66)  RR: 18 (08-08-24 @ 04:40) (18 - 18)  SpO2: 99% (08-08-24 @ 04:40) (95% - 99%)  Wt(kg): --  CAPILLARY BLOOD GLUCOSE      POCT Blood Glucose.: 151 mg/dL (07 Aug 2024 21:03)      Labs                          12.4   6.04  )-----------( 199      ( 07 Aug 2024 10:15 )             37.0       08-07    142  |  108  |  9   ----------------------------<  190<H>  4.1   |  30  |  0.74    Ca    9.2      07 Aug 2024 10:15  Phos  3.4     08-07  Mg     2.3     08-07    TPro  7.3  /  Alb  4.0  /  TBili  0.7  /  DBili  x   /  AST  28  /  ALT  36  /  AlkPhos  84  08-07          Troponin I, High Sensitivity Result: 3.4 ng/L (08-07-24 @ 02:51)        Radiology Results      Meds    MEDICATIONS  (STANDING):  aspirin  chewable 81 milliGRAM(s) Oral daily  clopidogrel Tablet 75 milliGRAM(s) Oral daily  enoxaparin Injectable 40 milliGRAM(s) SubCutaneous every 24 hours  insulin lispro (ADMELOG) corrective regimen sliding scale   SubCutaneous at bedtime  insulin lispro (ADMELOG) corrective regimen sliding scale   SubCutaneous three times a day before meals      MEDICATIONS  (PRN):  acetaminophen     Tablet .. 650 milliGRAM(s) Oral every 6 hours PRN Temp greater or equal to 38C (100.4F), Mild Pain (1 - 3)  aluminum hydroxide/magnesium hydroxide/simethicone Suspension 30 milliLiter(s) Oral every 4 hours PRN Dyspepsia  melatonin 3 milliGRAM(s) Oral at bedtime PRN Insomnia  ondansetron Injectable 4 milliGRAM(s) IV Push every 8 hours PRN Nausea and/or Vomiting      Physical Exam    Neuro :  no focal deficits  Respiratory: CTA B/L  CV: RRR, S1S2, no murmurs,   Abdominal: Soft, NT, ND +BS,  Extremities: No edema, + peripheral pulses    ASSESSMENT    r/o acute cva,   r/o arythmia,   h/o htn,   dm,   hld    Anesthesia of skin    Diabetes    HLD (hyperlipidemia)        PLAN    adm to tele,   aspirin,   statin,   hold outpt dm meds,   lispro ss,   cont preadmit home meds,   gi and dvt prophylaxis  cbc,   bmp,   mg,   phos,   lipid,   tsh,   hgba1c,   ce q8 x3    echo      cardio cons  neuro cons.       Patient is a 54y old  Female who presents with a chief complaint of CVA R/o (07 Aug 2024 12:31)    pt seen in tele [ x ], reg med floor [   ], bed [ x ], chair at bedside [   ], a+o x3 [ x ], lethargic [  ],  nad [x  ]      Allergies    No Known Allergies        Vitals    T(F): 97.7 (08-08-24 @ 04:40), Max: 98.4 (08-07-24 @ 19:17)  HR: 54 (08-08-24 @ 04:40) (48 - 71)  BP: 118/65 (08-08-24 @ 04:40) (101/49 - 139/66)  RR: 18 (08-08-24 @ 04:40) (18 - 18)  SpO2: 99% (08-08-24 @ 04:40) (95% - 99%)  Wt(kg): --  CAPILLARY BLOOD GLUCOSE      POCT Blood Glucose.: 151 mg/dL (07 Aug 2024 21:03)      Labs                          12.4   6.04  )-----------( 199      ( 07 Aug 2024 10:15 )             37.0       08-07    142  |  108  |  9   ----------------------------<  190<H>  4.1   |  30  |  0.74    Ca    9.2      07 Aug 2024 10:15  Phos  3.4     08-07  Mg     2.3     08-07    TPro  7.3  /  Alb  4.0  /  TBili  0.7  /  DBili  x   /  AST  28  /  ALT  36  /  AlkPhos  84  08-07          Troponin I, High Sensitivity Result: 3.4 ng/L (08-07-24 @ 02:51)    A1C with Estimated Average Glucose (08.07.24 @ 10:15)   A1C with Estimated Average Glucose Result: 5.8:    Radiology Results    < from: MR Angio Head No Cont (08.07.24 @ 16:37) >  IMPRESSION:    MRI BRAIN:  1. No acute intracranial hemorrhage or evidence of acute ischemia.  2. Multiple nonspecific abnormal white matter foci of T2/FLAIR   prolongation statistically favoring microvascular type changes.    MRA HEAD:  1. No large vessel occlusion or major stenosis.    < end of copied text >      < from: US Duplex Venous Lower Ext Complete, Bilateral (08.07.24 @ 16:55) >  IMPRESSION:  No evidence of deep venous thrombosis in either lower extremity.    < end of copied text >        Meds    MEDICATIONS  (STANDING):  aspirin  chewable 81 milliGRAM(s) Oral daily  clopidogrel Tablet 75 milliGRAM(s) Oral daily  enoxaparin Injectable 40 milliGRAM(s) SubCutaneous every 24 hours  insulin lispro (ADMELOG) corrective regimen sliding scale   SubCutaneous at bedtime  insulin lispro (ADMELOG) corrective regimen sliding scale   SubCutaneous three times a day before meals      MEDICATIONS  (PRN):  acetaminophen     Tablet .. 650 milliGRAM(s) Oral every 6 hours PRN Temp greater or equal to 38C (100.4F), Mild Pain (1 - 3)  aluminum hydroxide/magnesium hydroxide/simethicone Suspension 30 milliLiter(s) Oral every 4 hours PRN Dyspepsia  melatonin 3 milliGRAM(s) Oral at bedtime PRN Insomnia  ondansetron Injectable 4 milliGRAM(s) IV Push every 8 hours PRN Nausea and/or Vomiting      Physical Exam    Neuro :  no focal deficits  Respiratory: CTA B/L  CV: RRR, S1S2, no murmurs,   Abdominal: Soft, NT, ND +BS,  Extremities: No edema, + peripheral pulses    ASSESSMENT    acute cva r/o,   arrythmia r/o    h/o htn,   dm,   hld        PLAN    d/c tele,   mri with No acute intracranial hemorrhage or evidence of acute ischemia.  Multiple nonspecific abnormal white matter foci of T2/FLAIR   prolongation statistically favoring microvascular type changes.  No large vessel occlusion or major stenosis noted above.   neuro f/u   Secondary stroke prevention: ASA 81mg (or ASA 325mg rectally if unable to take p) and Lipitor 40mg HS; Plavix x 3 weeks.    PT evaluation  trop x1 neg noted above  f/u echo   cardio f/u   No pertinent events not tele thus far  asa and plavix per neuro   le doppler neg for dvt noted above  hold outpt dm meds,   hgba1c 5.8 noted above  lispro ss,   cont current meds  d/c plan pending echo and neuro recs

## 2024-08-08 NOTE — DISCHARGE NOTE NURSING/CASE MANAGEMENT/SOCIAL WORK - PATIENT PORTAL LINK FT
You can access the FollowMyHealth Patient Portal offered by [facility] by registering at the following website: [email or web address]. By joining [facility]’s FollowMyHealth portal, you will also be able to view your health information using other applications (apps) compatible with our system.

## 2024-08-08 NOTE — DISCHARGE NOTE PROVIDER - PROVIDER TOKENS
PROVIDER:[TOKEN:[[id number]],FOLLOWUP:[2 weeks]],PROVIDER:[TOKEN:[[id number]],FOLLOWUP:[1 week]],PROVIDER:[TOKEN:[[id number]],FOLLOWUP:[2 weeks]]

## 2024-08-08 NOTE — DISCHARGE NOTE PROVIDER - NSDCCPCAREPLAN_GEN_ALL_CORE_FT
PRINCIPAL DISCHARGE DIAGNOSIS  Diagnosis: CVA (cerebrovascular accident)  Assessment and Plan of Treatment: You presented with left sided numbness and was admitted for concern for stroke. Your imaging did not show any evidence of stroke or TIA (Transient ischemic attack). You were evaluated by a neurologist who recommended to start taking aspirin, plavix (for 21 days) and anticholesterol medication. Your symptoms are all resolved  You had echocardiogram and it showed positive PFO (patent foramen ovale). Follow up with your cardiologist for further management  Follow up with cardiologist or primary care provider  If you have slurred speech, swallowing difficulty or weakness on one side, please come to ED  Call 911:  You have any of the following signs of a heart attack:  Squeezing, pressure, or pain in your chest  Discomfort or pain in your back, neck, jaw, stomach, or arm  Shortness of breath  Nausea or vomiting  Lightheadedness or a sudden cold sweat  You have any of the following signs of a stroke:  Numbness or drooping on one side of your face  Weakness in an arm or leg  Confusion or difficulty speaking  Dizziness, a severe headache, or vision loss  Your heart is beating faster than usual or fluttering more than usual.  You feel dizzy or faint.  Warning signs of a stroke: The word F.A.S.T. can help you remember and recognize signs of a stroke:  F = Face: One side of the face droops.  A = Arms: One arm starts to drop when both arms are raised.  S = Speech: Speech is slurred or sounds different than usual.  T = Time: A person who is having a stroke needs to be seen immediately. A stroke is a medical emergency that needs immediate treatment. Some medicines and treatments work best if given within a few hours of a stroke. Early treatment can decrease the risk of long-term effects of a stroke.      SECONDARY DISCHARGE DIAGNOSES  Diagnosis: DM (diabetes mellitus)  Assessment and Plan of Treatment: HgA1C this admission 5.8  Make sure you get your HgA1c checked every three months.  If you have not seen your ophthalmologist this year call for appointment.  Check your feet daily for redness, sores, or openings. Do not self treat. If no improvement in two days call your primary care physician for an appointment.  Follow up with endocrinologist to establish long term goals for your A1c and for long term management and monitoring      Diagnosis: HLD (hyperlipidemia)  Assessment and Plan of Treatment: Continue taking your anticholesterol medication at home. Follow up with primary care provider for long term management of this condition

## 2024-08-08 NOTE — DISCHARGE NOTE PROVIDER - CARE PROVIDER_API CALL
Chris Flores  Internal Medicine  [address] [place] [address]  Phone: [phone]  Fax: [phone]  Follow Up Time: 2 weeks    Colton Hanks  Cardiology  [address] [place] [address], [address]  [place] [address]  Phone: [phone]  Fax: [phone]  Follow Up Time: 1 week    Nicohle Alicea  Neurology  [address] [place] [address], [address]  [place] [address]  Phone: [phone]  Fax: [phone]  Follow Up Time: 2 weeks

## 2024-08-08 NOTE — DISCHARGE NOTE PROVIDER - CARE PROVIDERS DIRECT ADDRESSES
,DirectAddress_Unknown,DirectAddress_Unknown,[email or web address]

## 2024-08-08 NOTE — PROGRESS NOTE ADULT - SUBJECTIVE AND OBJECTIVE BOX
C A R D I O L O G Y  **********************************     DATE OF SERVICE: 08-08-24    Patient denies chest pain or shortness of breath.   Review of systems otherwise (-)  	  MEDICATIONS:  MEDICATIONS  (STANDING):  aspirin  chewable 81 milliGRAM(s) Oral daily  clopidogrel Tablet 75 milliGRAM(s) Oral daily  enoxaparin Injectable 40 milliGRAM(s) SubCutaneous every 24 hours  insulin lispro (ADMELOG) corrective regimen sliding scale   SubCutaneous at bedtime  insulin lispro (ADMELOG) corrective regimen sliding scale   SubCutaneous three times a day before meals      LABS:	 	    CARDIAC MARKERS:        Troponin I, High Sensitivity Result: 3.4 ng/L (08-07-24 @ 02:51)                              13.4   6.74  )-----------( 224      ( 08 Aug 2024 08:35 )             40.9     Hemoglobin: 13.4 g/dL (08-08 @ 08:35)  Hemoglobin: 12.4 g/dL (08-07 @ 10:15)  Hemoglobin: 13.3 g/dL (08-07 @ 02:51)      08-08    140  |  108  |  12  ----------------------------<  119<H>  4.8   |  29  |  0.63    Ca    9.2      08 Aug 2024 08:35  Phos  3.4     08-07  Mg     2.3     08-07    TPro  7.6  /  Alb  4.0  /  TBili  0.9  /  DBili  x   /  AST  25  /  ALT  35  /  AlkPhos  67  08-08    Creatinine Trend: 0.63<--, 0.74<--, 0.74<--        PHYSICAL EXAM:  T(C): 36.7 (08-08-24 @ 07:44), Max: 36.9 (08-07-24 @ 19:17)  HR: 52 (08-08-24 @ 07:44) (48 - 71)  BP: 115/69 (08-08-24 @ 07:44) (101/49 - 139/66)  RR: 18 (08-08-24 @ 07:44) (18 - 18)  SpO2: 98% (08-08-24 @ 07:44) (95% - 99%)  Wt(kg): --  I&O's Summary    08 Aug 2024 07:01  -  08 Aug 2024 10:15  --------------------------------------------------------  IN: 220 mL / OUT: 0 mL / NET: 220 mL      HEENT:  (-)icterus (-)pallor  CV: N S1 S2 1/6 MOMO (+)2 Pulses B/l  Resp:  Clear to ausculatation B/L, normal effort  GI: (+) BS Soft, NT, ND  Lymph:  (-)Edema, (-)obvious lymphadenopathy  Skin: Warm to touch, Normal turgor  Psych: Appropriate mood and affect      TELEMETRY: 	  sinus        ASSESSMENT/PLAN: 	54y  Female  PMH DM and HLD well known to our office, normal stress test and echo earlier this year presented to the ED for left sided facial and arm numbness concern for CVA    # CVA  - Neuro eval noted  -MRI and MRA noted   - No pertinetn events not yudelka thus far  - asa and plavix per neuro  - start Lipitor 20 mg PO QHS  - Echo with PFO, Will need outpt stuctural heart eval if this is believed to be an embolic event   - lower extremity dopplers negative for DVT    Sergio Gutierrez MD, [facility]  BEEPER [phone]

## 2024-08-08 NOTE — DISCHARGE NOTE PROVIDER - NSDCMRMEDTOKEN_GEN_ALL_CORE_FT
aspirin 81 mg oral tablet, chewable: 1 tab(s) orally once a day  atorvastatin 40 mg oral tablet: 1 tab(s) orally once a day (at bedtime)  clopidogrel 75 mg oral tablet: 1 tab(s) orally once a day  Gemtesa 75 mg oral tablet: 1 tab(s) orally once a day  metFORMIN 500 mg oral tablet: 1 tab(s) orally 2 times a day  oxyBUTYnin 5 mg oral tablet: 1 tab(s) orally once a day  Symbicort 160 mcg-4.5 mcg/inh inhalation aerosol: 2 puff(s) inhaled 2 times a day  Trulicity Pen 0.75 mg/0.5 mL subcutaneous solution: 0.75 milligram(s) subcutaneously once a week

## 2024-08-08 NOTE — DISCHARGE NOTE PROVIDER - HOSPITAL COURSE
54F PMH DM and HLD presented to the ED for left sided facial and arm numbness. After a nap the patient woke up and felt her left side of face and left arm felt numb with complains of severe left sided headache. Admitted for stroke w/u and paresthesias    CT stroke protocol with no acute findings. Neurology followed, recommended MR brain/head, MR brain with No acute intracranial hemorrhage or evidence of acute ischemia. Multiple nonspecific abnormal white matter foci of T2/FLAIR prolongation statistically favoring microvascular type changes. MR head with No large vessel occlusion or major stenosis. B/L LE doppler neg for DVT    Echo showed with EF 74%, + PFO. Discussed with cardiologist Will need outpt stuctural heart eval  Discussed with neuro -- microvascular changes in MR brain can be likely due to other comorbidities (HTN, DM, HLD). Not TIA or stroke indication. Will continue on ASA and plavix for 21 days with statin    All symptoms have been resolved    Pt is medically optimized for discharge, discussed with the attending Dr Michelle  This is just a brief hospital course, please refer to the progress notes for detailed information

## 2024-08-08 NOTE — DISCHARGE NOTE NURSING/CASE MANAGEMENT/SOCIAL WORK - NSDCPEFALRISK_GEN_ALL_CORE
For information on Fall & Injury Prevention, visit: [email or web address] OR  [email or web address] OR  https://www.cdc.gov/steadi/patient.html

## 2024-08-14 PROBLEM — E11.9 TYPE 2 DIABETES MELLITUS WITHOUT COMPLICATIONS: Chronic | Status: ACTIVE | Noted: 2024-08-07

## 2024-08-14 PROBLEM — E78.5 HYPERLIPIDEMIA, UNSPECIFIED: Chronic | Status: ACTIVE | Noted: 2024-08-07

## 2024-08-22 ENCOUNTER — NON-APPOINTMENT (OUTPATIENT)
Age: 54
End: 2024-08-22

## 2024-08-23 ENCOUNTER — APPOINTMENT (OUTPATIENT)
Dept: RADIOLOGY | Facility: CLINIC | Age: 54
End: 2024-08-23
Payer: COMMERCIAL

## 2024-08-23 ENCOUNTER — APPOINTMENT (OUTPATIENT)
Dept: NEUROLOGY | Facility: CLINIC | Age: 54
End: 2024-08-23
Payer: COMMERCIAL

## 2024-08-23 VITALS
HEIGHT: 62 IN | OXYGEN SATURATION: 97 % | DIASTOLIC BLOOD PRESSURE: 83 MMHG | TEMPERATURE: 97.3 F | BODY MASS INDEX: 34.04 KG/M2 | WEIGHT: 185 LBS | SYSTOLIC BLOOD PRESSURE: 141 MMHG | HEART RATE: 72 BPM

## 2024-08-23 DIAGNOSIS — Z09 ENCOUNTER FOR FOLLOW-UP EXAMINATION AFTER COMPLETED TREATMENT FOR CONDITIONS OTHER THAN MALIGNANT NEOPLASM: ICD-10-CM

## 2024-08-23 DIAGNOSIS — I63.9 CEREBRAL INFARCTION, UNSPECIFIED: ICD-10-CM

## 2024-08-23 DIAGNOSIS — R93.89 ABNORMAL FINDINGS ON DIAGNOSTIC IMAGING OF OTHER SPECIFIED BODY STRUCTURES: ICD-10-CM

## 2024-08-23 DIAGNOSIS — Z86.39 PERSONAL HISTORY OF OTHER ENDOCRINE, NUTRITIONAL AND METABOLIC DISEASE: ICD-10-CM

## 2024-08-23 DIAGNOSIS — Z83.3 FAMILY HISTORY OF DIABETES MELLITUS: ICD-10-CM

## 2024-08-23 DIAGNOSIS — Q21.12 PATENT FORAMEN OVALE: ICD-10-CM

## 2024-08-23 DIAGNOSIS — R20.0 ANESTHESIA OF SKIN: ICD-10-CM

## 2024-08-23 DIAGNOSIS — M54.42 LUMBAGO WITH SCIATICA, LEFT SIDE: ICD-10-CM

## 2024-08-23 DIAGNOSIS — G89.29 LUMBAGO WITH SCIATICA, LEFT SIDE: ICD-10-CM

## 2024-08-23 DIAGNOSIS — G45.1 CAROTID ARTERY SYNDROME (HEMISPHERIC): ICD-10-CM

## 2024-08-23 DIAGNOSIS — Z87.891 PERSONAL HISTORY OF NICOTINE DEPENDENCE: ICD-10-CM

## 2024-08-23 DIAGNOSIS — Z78.9 OTHER SPECIFIED HEALTH STATUS: ICD-10-CM

## 2024-08-23 PROBLEM — Z00.00 ENCOUNTER FOR PREVENTIVE HEALTH EXAMINATION: Status: ACTIVE | Noted: 2024-08-23

## 2024-08-23 RX ORDER — DULAGLUTIDE 0.75 MG/.5ML
0.75 INJECTION, SOLUTION SUBCUTANEOUS
Refills: 0 | Status: ACTIVE | COMMUNITY

## 2024-08-23 RX ORDER — CLOPIDOGREL BISULFATE 75 MG/1
75 TABLET, FILM COATED ORAL
Refills: 0 | Status: ACTIVE | COMMUNITY

## 2024-08-23 RX ORDER — ATORVASTATIN CALCIUM 40 MG/1
40 TABLET, FILM COATED ORAL
Refills: 0 | Status: ACTIVE | COMMUNITY

## 2024-08-23 RX ORDER — METFORMIN HYDROCHLORIDE 500 MG/1
500 TABLET, COATED ORAL
Refills: 0 | Status: ACTIVE | COMMUNITY

## 2024-08-23 NOTE — CONSULT LETTER
[Dear  ___] : Dear  [unfilled], [Consult Letter:] : I had the pleasure of evaluating your patient, [unfilled]. [Please see my note below.] : Please see my note below. [Consult Closing:] : Thank you very much for allowing me to participate in the care of this patient.  If you have any questions, please do not hesitate to contact me. [FreeTextEntry3] : Nichole Alicea MD, MPH

## 2024-08-23 NOTE — HISTORY OF PRESENT ILLNESS
[FreeTextEntry1] : 54F PMH DM and HLD presented to the [facility] for left sided facial and arm numbness. symptoms resolved.  No difficulty with language. No vision loss or double vision. No dizziness, vertigo or new hearing loss. No difficulty with speech or swallowing. No focal weakness. No numbness or tingling in the bilateral lower extremities. No difficulty with balance. No difficulty with ambulation.  No further episode. pt d/gary 8/8/24 from [facility].  Pt has tingling sensation to the left leg in setting of LBP, present for sometime, worse with exercise (Joseph).  NO weakness, b/b incontinence or saddle anesthesia.

## 2024-08-23 NOTE — ASSESSMENT
[FreeTextEntry1] : TIA cw [id number] and [id number] cw plavix x 3 weeks BP goal of normal concern for fibromuscular dysplasia on CTA, no hyperextendable joints on exam, will refer to Dr. Libman for eval  PFO, LE dupplex normal  tingling sensation to the left leg in setting of LBP concerning for LS radic will get Xray LS spine and ncs.emg legs and refer to PT  fu after test above  I spent the time noted on the day of this patient encounter preparing for, providing and documenting the above E/M service and counseling and educate patient on differential, workup, disease course, and treatment/management. Education was provided to the patient during this encounter. All questions and concerns were answered and addressed in detail. The patient verbalized understanding and agreed to plan. Patient was advised to continue to monitor for neurologic symptoms and to notify my office or go to the nearest emergency room if there are any changes. Any orders/referrals and communications were provided as well.   Side effects of the above medications were discussed in detail including but not limited to applicable black box warning and teratogenicity as appropriate.  For patients with seizures, I discussed risk of SUDEP with patient and advised that SUDEP risk can be minimized with good seizure control through medication compliance and other measures. Patient was advised to bring previous records to my office, including CD of imaging, when applicable.

## 2024-08-23 NOTE — DATA REVIEWED
[de-identified] : MRI BRAIN: 1. No acute intracranial hemorrhage or evidence of acute ischemia. 2. Multiple nonspecific abnormal white matter foci of T2/FLAIR prolongation statistically favoring microvascular type changes.  MRA HEAD: 1. No large vessel occlusion or major stenosis. [de-identified] : TTE:  1. Left ventricular systolic function is normal with an ejection fraction of 74 % by Brothers's method of disks.  2. There is normal LV mass and concentric remodeling.  3. Normal left ventricular diastolic function.  4. Normal right ventricular cavity size, with normal wall thickness, and normal right ventricular systolic function.  5. Agitated saline injection reveals bubbles in the left heart, consistent with a patent foramen ovale.  cta irregular ica suggestive of MRI BRAIN: 1. No acute intracranial hemorrhage or evidence of acute ischemia. 2. Multiple nonspecific abnormal white matter foci of T2/FLAIR prolongation statistically favoring microvascular type changes.  MRA HEAD: 1. No large vessel occlusion or major stenosis.  cmp noted EKG noted Test Item	Value	Units	Test Item Status	Comments Ventricular rate	62	BPM	Final	  Atrial rate	62	BPM	Final	  P-R interval	190	ms	Final	  QRS duration	72	ms	Final	  Q-T interval	422	ms	Final	  Q-T interval corrected	428	ms	Final	  P wave axis	43	degrees	Final	  R wave axis	18	degrees	Final	  T wave axis	26	degrees	Final	  WAVEFORM URL	 	 	Final	  Diagnosis Line	Normal sinus rhythm Nonspecific ST abnormality Abnormal ECG Confirmed by YUNIOR VILLATORO MD ([id number]) on 8/12/2024 12:42:46 PM	 	Final	   LEdupllex: No evidence of deep venous thrombosis in either lower extremity.

## 2024-09-17 ENCOUNTER — APPOINTMENT (OUTPATIENT)
Dept: NEUROLOGY | Facility: CLINIC | Age: 54
End: 2024-09-17
Payer: COMMERCIAL

## 2024-09-17 DIAGNOSIS — R20.0 ANESTHESIA OF SKIN: ICD-10-CM

## 2024-09-17 NOTE — HISTORY OF PRESENT ILLNESS
[Other Location: e.g. School (Enter Location, City,State)___] : at [unfilled], at the time of the visit. [Medical Office: ([facility])___] : at the medical office located in  [Verbal consent obtained from patient] : the patient, [unfilled] [FreeTextEntry1] : She is a 54 year old right lady.  On 8/7/24, she presented to [facility] with left face, arm and leg  numbness. The arm and leg numbness occurred simultaneously, and less than 5 minutes later the facial numbness developed. She had  head pressure at the time. The episode lasted 5-6 hours. She had no trouble walking at the time. She has since returned to her pre-episode baseline and has not had any recurrent episodes. She is scheduled for a ARLEEN and will have an ILR implanted next week.   Workup at [facility]: - MRI brain 8/7/24:  To my eye, there are a few subcortical white matter hyperintensities of presumed vascular origin. Otherwise, unremarkable. - CTA head and neck 8/7/24: Minimal atherosclerotic calcification of the aortic arch. Minimal atherosclerotic calcification of the left cavernous ICA. Diminutive left vertebral artery terminates primarily in the left posterior inferior cerebellar artery. Faint blush of contrast, along the right greater than left inferior cerebellar hemispheres, which likely reflects choroid plexus rather than vascular malformation. Irregular beaded appearance of the bilateral distal cervical internal carotid arteries, which is suggestive of fibromuscular dysplasia or other arteriopathy. - TTE 8/7/24:  EF 74 %. PFO. B/L LE doppler neg for DVT  Referring doctor: Dr. Nichole Alicea PCP Dr. Chris Flores Cardiologist Dr. Jaswinder Apple ([facility])

## 2024-09-17 NOTE — PHYSICAL EXAM
[General Appearance - Alert] : alert [General Appearance - In No Acute Distress] : in no acute distress [Oriented To Time, Place, And Person] : oriented to person, place, and time [Impaired Insight] : insight and judgment were intact [Affect] : the affect was normal [Extraocular Movements] : extraocular movements were intact [Outer Ear] : the ears and nose were normal in appearance [Examination Of The Oral Cavity] : the lips and gums were normal [Neck Appearance] : the appearance of the neck was normal [Exaggerated Use Of Accessory Muscles For Inspiration] : no accessory muscle use [Involuntary Movements] : no involuntary movements were seen [Skin Color & Pigmentation] : normal skin color and pigmentation [] : no rash [FreeTextEntry1] :  Generally looked well. Mental status exam: Alert, oriented x3, speech fluent/prosodic without paraphasias; comprehension intact; memory and fund of knowledge intact. On cranial nerve exam: At primary gaze, there is no eye deviation. EOMI. No ptosis. Face is symmetric with normal eye closure and smile. Hearing is grossly intact. Phonation is normal. On motor exam:  there was no drift and she moved all extremities spontaneously against gravity. Fine finger movements are minimally slowed bilaterally. Coordination and gait were normal. Tandem gait was normal. Sensory exam was intact to light touch and pain, but diminished at the left arm and leg (her daughter assisted).

## 2024-09-17 NOTE — DISCUSSION/SUMMARY
[FreeTextEntry1] : She has a mild left sided sensory deficit, likely due to her presumed recent stroke. She is otherwise neurologically intact. Of note, the exam was limited, as the visit was conducted via telehealth.  To summarize, in 8/7/24, she developed left face, arm and leg numbness, localizing perhaps to right thalamic dysfunction, less likely cortical dysfunction, possibly due to an MRI- negative stroke of unknown mechanism, but perhaps small vessel disease. The CTA demonstrated signs consistent with bilateral distal ICA fibromuscular dysplasia without stenosis or dissection; the clinical significance of this finding is indeterminate but doubtful. She also has a PFO, which may also be a clinically incidental finding, given that her clinical syndrome may be more c/w small vessel disease than with embolism..  - It is possible that because the MRI was done within 48 hours of symptom onset, that an infarct was not imaged. I have requested a repeat MRI brain, as this may help clarify what the stroke mechanism was. Pending further information, depending on whether there is an imaged infarct consistent with embolism versus small vessel disease, this may influence whether the PFO is symptomatic. If the imaging is normal, the role of the PFO remains indeterminate but doubtful and I would favor medical management over closure. - She will have an ILR placed next week. - She should benefit from aggressive vascular risk factor control. In terms of lipids, target LDL should be less than 55. - She is taking Aspirin for secondary stroke prevention.  She can follow up in 2-3 months with [facility]. I hope she remains free of further serious trouble.

## 2024-09-18 ENCOUNTER — OUTPATIENT (OUTPATIENT)
Dept: OUTPATIENT SERVICES | Facility: HOSPITAL | Age: 54
LOS: 1 days | End: 2024-09-18
Payer: COMMERCIAL

## 2024-09-18 ENCOUNTER — APPOINTMENT (OUTPATIENT)
Dept: CV DIAGNOSITCS | Facility: HOSPITAL | Age: 54
End: 2024-09-18

## 2024-09-18 ENCOUNTER — RESULT REVIEW (OUTPATIENT)
Age: 54
End: 2024-09-18

## 2024-09-18 VITALS
OXYGEN SATURATION: 97 % | HEART RATE: 60 BPM | TEMPERATURE: 98 F | SYSTOLIC BLOOD PRESSURE: 128 MMHG | DIASTOLIC BLOOD PRESSURE: 62 MMHG | RESPIRATION RATE: 16 BRPM

## 2024-09-18 DIAGNOSIS — I25.10 ATHEROSCLEROTIC HEART DISEASE OF NATIVE CORONARY ARTERY WITHOUT ANGINA PECTORIS: ICD-10-CM

## 2024-09-18 PROCEDURE — 93320 DOPPLER ECHO COMPLETE: CPT | Mod: 26

## 2024-09-18 PROCEDURE — 93312 ECHO TRANSESOPHAGEAL: CPT | Mod: 26

## 2024-09-18 PROCEDURE — 93325 DOPPLER ECHO COLOR FLOW MAPG: CPT

## 2024-09-18 PROCEDURE — 93312 ECHO TRANSESOPHAGEAL: CPT

## 2024-09-18 PROCEDURE — 93320 DOPPLER ECHO COMPLETE: CPT

## 2024-09-18 PROCEDURE — 93325 DOPPLER ECHO COLOR FLOW MAPG: CPT | Mod: 26

## 2024-09-20 ENCOUNTER — NON-APPOINTMENT (OUTPATIENT)
Age: 54
End: 2024-09-20

## 2024-09-21 ENCOUNTER — APPOINTMENT (OUTPATIENT)
Dept: MRI IMAGING | Facility: CLINIC | Age: 54
End: 2024-09-21
Payer: COMMERCIAL

## 2024-09-23 ENCOUNTER — NON-APPOINTMENT (OUTPATIENT)
Age: 54
End: 2024-09-23

## 2024-09-25 ENCOUNTER — TRANSCRIPTION ENCOUNTER (OUTPATIENT)
Age: 54
End: 2024-09-25

## 2024-09-25 ENCOUNTER — OUTPATIENT (OUTPATIENT)
Dept: OUTPATIENT SERVICES | Facility: HOSPITAL | Age: 54
LOS: 1 days | End: 2024-09-25
Payer: COMMERCIAL

## 2024-09-25 VITALS
SYSTOLIC BLOOD PRESSURE: 134 MMHG | DIASTOLIC BLOOD PRESSURE: 71 MMHG | RESPIRATION RATE: 16 BRPM | OXYGEN SATURATION: 95 % | TEMPERATURE: 98 F | HEART RATE: 62 BPM

## 2024-09-25 DIAGNOSIS — Z98.891 HISTORY OF UTERINE SCAR FROM PREVIOUS SURGERY: Chronic | ICD-10-CM

## 2024-09-25 DIAGNOSIS — I48.91 UNSPECIFIED ATRIAL FIBRILLATION: ICD-10-CM

## 2024-09-25 PROCEDURE — C1764: CPT

## 2024-09-25 PROCEDURE — 33285 INSJ SUBQ CAR RHYTHM MNTR: CPT

## 2024-09-25 RX ORDER — CEPHALEXIN 500 MG
500 CAPSULE ORAL ONCE
Refills: 0 | Status: COMPLETED | OUTPATIENT
Start: 2024-09-25 | End: 2024-09-25

## 2024-09-25 RX ADMIN — Medication 500 MILLIGRAM(S): at 07:35

## 2024-09-25 NOTE — ASU PATIENT PROFILE, ADULT - FALL HARM RISK - UNIVERSAL INTERVENTIONS
Bed in lowest position, wheels locked, appropriate side rails in place/Call bell, personal items and telephone in reach/Instruct patient to call for assistance before getting out of bed or chair/Non-slip footwear when patient is out of bed/Sargeant to call system/Physically safe environment - no spills, clutter or unnecessary equipment/Purposeful Proactive Rounding/Room/bathroom lighting operational, light cord in reach

## 2024-09-25 NOTE — ASU DISCHARGE PLAN (ADULT/PEDIATRIC) - NS MD DC FALL RISK RISK
For information on Fall & Injury Prevention, visit: [email or web address] OR  [email or web address] OR  https://www.cdc.gov/steadi/patient.html

## 2024-09-25 NOTE — PROGRESS NOTE ADULT - SUBJECTIVE AND OBJECTIVE BOX
EP Brief Operative Note    Diagnosis: AF unspecified  Procedure: ILR implant  Surgeon: Theresa Salmon M.D.  Findings: none  EBL: minimal  Specimens: none  Post-op Diagnosis: same  Assistants: none      A/P) s/p successful medtronic ILR implant, no acute complications    -d/c home  -no need for antibiotics  -no change to home meds  -f/u with me prn  -f/u with Miryam on 12/6 at 2pm      Theresa Salmon M.D., [facility]  Cardiac Electrophysiology  [facility]  [address] [place] [address], [place] [address]  [email or web address]    office [phone]  pager [phone]

## 2024-09-25 NOTE — ASU DISCHARGE PLAN (ADULT/PEDIATRIC) - CARE PROVIDER_API CALL
Theresa Salmon  Cardiovascular Disease  [address], [address]  [place] [address]  Phone: [phone]  Fax: [phone]  Follow Up Time:

## 2024-09-25 NOTE — H&P CARDIOLOGY - HISTORY OF PRESENT ILLNESS
54 yr old female with PMHx of HLD, DMT2, Recent Cryptogenic TIA, PFO presents for Loop Recorder Implant.

## 2024-10-11 PROBLEM — E78.00 PURE HYPERCHOLESTEROLEMIA, UNSPECIFIED: Chronic | Status: ACTIVE | Noted: 2018-12-04

## 2024-10-15 PROBLEM — E11.9 TYPE 2 DIABETES MELLITUS WITHOUT COMPLICATIONS: Chronic | Status: ACTIVE | Noted: 2024-08-07

## 2024-10-15 PROBLEM — E78.5 HYPERLIPIDEMIA, UNSPECIFIED: Chronic | Status: ACTIVE | Noted: 2024-08-07

## 2024-10-25 ENCOUNTER — APPOINTMENT (OUTPATIENT)
Dept: NEUROLOGY | Facility: CLINIC | Age: 54
End: 2024-10-25
Payer: COMMERCIAL

## 2024-10-25 VITALS
HEART RATE: 76 BPM | TEMPERATURE: 97.1 F | OXYGEN SATURATION: 97 % | BODY MASS INDEX: 34.04 KG/M2 | WEIGHT: 185 LBS | HEIGHT: 62 IN | SYSTOLIC BLOOD PRESSURE: 134 MMHG | DIASTOLIC BLOOD PRESSURE: 76 MMHG

## 2024-10-25 DIAGNOSIS — M54.16 RADICULOPATHY, LUMBAR REGION: ICD-10-CM

## 2024-10-25 PROCEDURE — 99205 OFFICE O/P NEW HI 60 MIN: CPT | Mod: 25

## 2024-10-25 PROCEDURE — 95912 NRV CNDJ TEST 11-12 STUDIES: CPT

## 2024-10-25 PROCEDURE — 95886 MUSC TEST DONE W/N TEST COMP: CPT | Mod: LT,RT

## 2024-10-25 RX ORDER — NAPROXEN SODIUM 375 MG/1
375 TABLET, FILM COATED, EXTENDED RELEASE ORAL
Qty: 30 | Refills: 5 | Status: ACTIVE | COMMUNITY
Start: 2024-10-25 | End: 1900-01-01

## 2024-10-30 ENCOUNTER — APPOINTMENT (OUTPATIENT)
Dept: NEUROLOGY | Facility: CLINIC | Age: 54
End: 2024-10-30

## 2024-11-07 ENCOUNTER — APPOINTMENT (OUTPATIENT)
Dept: MRI IMAGING | Facility: CLINIC | Age: 54
End: 2024-11-07
Payer: COMMERCIAL

## 2024-11-07 ENCOUNTER — APPOINTMENT (OUTPATIENT)
Dept: CT IMAGING | Facility: CLINIC | Age: 54
End: 2024-11-07
Payer: COMMERCIAL

## 2024-11-07 PROCEDURE — 74178 CT ABD&PLV WO CNTR FLWD CNTR: CPT

## 2024-11-07 PROCEDURE — 72148 MRI LUMBAR SPINE W/O DYE: CPT

## 2024-11-11 ENCOUNTER — APPOINTMENT (OUTPATIENT)
Dept: NEUROLOGY | Facility: CLINIC | Age: 54
End: 2024-11-11
Payer: COMMERCIAL

## 2024-11-11 ENCOUNTER — APPOINTMENT (OUTPATIENT)
Dept: CT IMAGING | Facility: CLINIC | Age: 54
End: 2024-11-11

## 2024-11-11 VITALS
BODY MASS INDEX: 34.04 KG/M2 | SYSTOLIC BLOOD PRESSURE: 135 MMHG | HEART RATE: 54 BPM | HEIGHT: 62 IN | WEIGHT: 185 LBS | DIASTOLIC BLOOD PRESSURE: 80 MMHG

## 2024-11-11 DIAGNOSIS — G45.1 CAROTID ARTERY SYNDROME (HEMISPHERIC): ICD-10-CM

## 2024-11-11 DIAGNOSIS — Q21.12 PATENT FORAMEN OVALE: ICD-10-CM

## 2024-11-11 PROCEDURE — 93892 TCD EMBOLI DETECT W/O INJ: CPT

## 2024-11-11 PROCEDURE — G2211 COMPLEX E/M VISIT ADD ON: CPT

## 2024-11-11 PROCEDURE — 93886 INTRACRANIAL COMPLETE STUDY: CPT

## 2024-11-11 PROCEDURE — 99214 OFFICE O/P EST MOD 30 MIN: CPT

## 2024-11-11 PROCEDURE — 93880 EXTRACRANIAL BILAT STUDY: CPT

## 2024-11-11 RX ORDER — VIBEGRON 75 MG/1
TABLET, FILM COATED ORAL
Refills: 0 | Status: ACTIVE | COMMUNITY

## 2025-02-10 ENCOUNTER — APPOINTMENT (OUTPATIENT)
Dept: NEUROLOGY | Facility: CLINIC | Age: 55
End: 2025-02-10
Payer: COMMERCIAL

## 2025-02-10 VITALS
BODY MASS INDEX: 33.49 KG/M2 | SYSTOLIC BLOOD PRESSURE: 145 MMHG | HEART RATE: 80 BPM | TEMPERATURE: 98.1 F | OXYGEN SATURATION: 96 % | HEIGHT: 62 IN | DIASTOLIC BLOOD PRESSURE: 80 MMHG | WEIGHT: 182 LBS

## 2025-02-10 DIAGNOSIS — M54.42 LUMBAGO WITH SCIATICA, LEFT SIDE: ICD-10-CM

## 2025-02-10 DIAGNOSIS — Z87.891 PERSONAL HISTORY OF NICOTINE DEPENDENCE: ICD-10-CM

## 2025-02-10 DIAGNOSIS — I63.9 CEREBRAL INFARCTION, UNSPECIFIED: ICD-10-CM

## 2025-02-10 DIAGNOSIS — Z83.3 FAMILY HISTORY OF DIABETES MELLITUS: ICD-10-CM

## 2025-02-10 DIAGNOSIS — Z09 ENCOUNTER FOR FOLLOW-UP EXAMINATION AFTER COMPLETED TREATMENT FOR CONDITIONS OTHER THAN MALIGNANT NEOPLASM: ICD-10-CM

## 2025-02-10 DIAGNOSIS — G89.29 LUMBAGO WITH SCIATICA, LEFT SIDE: ICD-10-CM

## 2025-02-10 DIAGNOSIS — Z86.39 PERSONAL HISTORY OF OTHER ENDOCRINE, NUTRITIONAL AND METABOLIC DISEASE: ICD-10-CM

## 2025-02-10 DIAGNOSIS — R20.0 ANESTHESIA OF SKIN: ICD-10-CM

## 2025-02-10 DIAGNOSIS — Z78.9 OTHER SPECIFIED HEALTH STATUS: ICD-10-CM

## 2025-02-10 DIAGNOSIS — Q21.12 PATENT FORAMEN OVALE: ICD-10-CM

## 2025-02-10 DIAGNOSIS — G45.1 CAROTID ARTERY SYNDROME (HEMISPHERIC): ICD-10-CM

## 2025-02-10 PROCEDURE — 99215 OFFICE O/P EST HI 40 MIN: CPT

## 2025-02-10 PROCEDURE — G2211 COMPLEX E/M VISIT ADD ON: CPT

## 2025-05-12 ENCOUNTER — APPOINTMENT (OUTPATIENT)
Dept: NEUROLOGY | Facility: CLINIC | Age: 55
End: 2025-05-12

## 2025-06-19 ENCOUNTER — APPOINTMENT (OUTPATIENT)
Dept: NEUROLOGY | Facility: CLINIC | Age: 55
End: 2025-06-19

## 2025-06-30 ENCOUNTER — APPOINTMENT (OUTPATIENT)
Dept: NEUROLOGY | Facility: CLINIC | Age: 55
End: 2025-06-30
Payer: COMMERCIAL

## 2025-06-30 PROCEDURE — 93880 EXTRACRANIAL BILAT STUDY: CPT

## 2025-06-30 PROCEDURE — 93892 TCD EMBOLI DETECT W/O INJ: CPT

## 2025-06-30 PROCEDURE — 93886 INTRACRANIAL COMPLETE STUDY: CPT

## 2025-07-01 ENCOUNTER — APPOINTMENT (OUTPATIENT)
Dept: NEUROLOGY | Facility: CLINIC | Age: 55
End: 2025-07-01
Payer: COMMERCIAL

## 2025-07-01 VITALS
HEART RATE: 69 BPM | WEIGHT: 182 LBS | BODY MASS INDEX: 33.49 KG/M2 | HEIGHT: 62 IN | DIASTOLIC BLOOD PRESSURE: 82 MMHG | SYSTOLIC BLOOD PRESSURE: 134 MMHG

## 2025-07-01 PROCEDURE — G2211 COMPLEX E/M VISIT ADD ON: CPT | Mod: NC

## 2025-07-01 PROCEDURE — 99213 OFFICE O/P EST LOW 20 MIN: CPT

## 2025-07-01 RX ORDER — SEMAGLUTIDE 2.68 MG/ML
INJECTION, SOLUTION SUBCUTANEOUS
Refills: 0 | Status: ACTIVE | COMMUNITY

## 2025-07-01 RX ORDER — PRAVASTATIN SODIUM 20 MG/1
20 TABLET ORAL
Refills: 0 | Status: ACTIVE | COMMUNITY

## 2025-07-12 ENCOUNTER — APPOINTMENT (OUTPATIENT)
Dept: MRI IMAGING | Facility: CLINIC | Age: 55
End: 2025-07-12
Payer: COMMERCIAL

## 2025-07-12 PROCEDURE — 70551 MRI BRAIN STEM W/O DYE: CPT

## 2025-07-15 ENCOUNTER — NON-APPOINTMENT (OUTPATIENT)
Age: 55
End: 2025-07-15

## 2025-07-23 ENCOUNTER — APPOINTMENT (OUTPATIENT)
Dept: MRI IMAGING | Facility: CLINIC | Age: 55
End: 2025-07-23
Payer: COMMERCIAL

## 2025-07-23 PROCEDURE — 72141 MRI NECK SPINE W/O DYE: CPT

## 2025-07-29 ENCOUNTER — NON-APPOINTMENT (OUTPATIENT)
Age: 55
End: 2025-07-29

## 2025-08-05 ENCOUNTER — APPOINTMENT (OUTPATIENT)
Dept: NEUROLOGY | Facility: CLINIC | Age: 55
End: 2025-08-05